# Patient Record
Sex: FEMALE | Race: WHITE | NOT HISPANIC OR LATINO | Employment: FULL TIME | URBAN - METROPOLITAN AREA
[De-identification: names, ages, dates, MRNs, and addresses within clinical notes are randomized per-mention and may not be internally consistent; named-entity substitution may affect disease eponyms.]

---

## 2023-08-15 ENCOUNTER — HOSPITAL ENCOUNTER (OUTPATIENT)
Dept: DATA CONVERSION | Facility: HOSPITAL | Age: 28
Discharge: HOME | End: 2023-08-15

## 2023-08-15 DIAGNOSIS — Z01.419 ENCOUNTER FOR GYNECOLOGICAL EXAMINATION (GENERAL) (ROUTINE) WITHOUT ABNORMAL FINDINGS: ICD-10-CM

## 2023-09-06 PROBLEM — F41.9 ANXIETY: Status: ACTIVE | Noted: 2023-09-06

## 2023-09-06 RX ORDER — FLUOXETINE HYDROCHLORIDE 40 MG/1
CAPSULE ORAL
COMMUNITY
Start: 2013-03-06 | End: 2024-01-09 | Stop reason: DRUGHIGH

## 2024-01-09 ENCOUNTER — OFFICE VISIT (OUTPATIENT)
Dept: PRIMARY CARE | Facility: CLINIC | Age: 29
End: 2024-01-09
Payer: COMMERCIAL

## 2024-01-09 VITALS
TEMPERATURE: 98.1 F | DIASTOLIC BLOOD PRESSURE: 74 MMHG | HEART RATE: 79 BPM | BODY MASS INDEX: 23.69 KG/M2 | SYSTOLIC BLOOD PRESSURE: 110 MMHG | WEIGHT: 138 LBS | OXYGEN SATURATION: 97 %

## 2024-01-09 DIAGNOSIS — R73.01 ELEVATED FASTING GLUCOSE: ICD-10-CM

## 2024-01-09 DIAGNOSIS — Z11.59 NEED FOR HEPATITIS C SCREENING TEST: ICD-10-CM

## 2024-01-09 DIAGNOSIS — R53.83 FATIGUE, UNSPECIFIED TYPE: ICD-10-CM

## 2024-01-09 DIAGNOSIS — Z01.89 ENCOUNTER FOR ROUTINE LABORATORY TESTING: ICD-10-CM

## 2024-01-09 DIAGNOSIS — Z00.00 ANNUAL PHYSICAL EXAM: Primary | ICD-10-CM

## 2024-01-09 DIAGNOSIS — F41.9 ANXIETY: ICD-10-CM

## 2024-01-09 PROCEDURE — 1036F TOBACCO NON-USER: CPT | Performed by: NURSE PRACTITIONER

## 2024-01-09 PROCEDURE — 99395 PREV VISIT EST AGE 18-39: CPT | Performed by: NURSE PRACTITIONER

## 2024-01-09 RX ORDER — NORETHINDRONE ACETATE AND ETHINYL ESTRADIOL 1MG-20(21)
1 KIT ORAL DAILY
COMMUNITY

## 2024-01-09 RX ORDER — FLUOXETINE HYDROCHLORIDE 60 MG/1
60 TABLET, FILM COATED ORAL; ORAL DAILY
Qty: 90 TABLET | Refills: 3 | Status: SHIPPED | OUTPATIENT
Start: 2024-01-09 | End: 2025-01-08

## 2024-01-09 ASSESSMENT — ENCOUNTER SYMPTOMS
COUGH: 0
POLYDIPSIA: 0
APPETITE CHANGE: 0
WOUND: 0
DIAPHORESIS: 0
BACK PAIN: 0
SHORTNESS OF BREATH: 0
NECK PAIN: 0
FATIGUE: 1
NERVOUS/ANXIOUS: 1
BLOOD IN STOOL: 0
HEADACHES: 0
DYSURIA: 0
OCCASIONAL FEELINGS OF UNSTEADINESS: 0
ABDOMINAL PAIN: 0
CHEST TIGHTNESS: 0
POLYPHAGIA: 0
VOMITING: 0
CHILLS: 0
FEVER: 0
PALPITATIONS: 0
SEIZURES: 0
SLEEP DISTURBANCE: 0
DEPRESSION: 0
SPEECH DIFFICULTY: 0
DIZZINESS: 0
LOSS OF SENSATION IN FEET: 0
DYSPHORIC MOOD: 0
FLANK PAIN: 0
HEMATURIA: 0
NAUSEA: 0

## 2024-01-09 ASSESSMENT — PATIENT HEALTH QUESTIONNAIRE - PHQ9
SUM OF ALL RESPONSES TO PHQ9 QUESTIONS 1 AND 2: 0
1. LITTLE INTEREST OR PLEASURE IN DOING THINGS: NOT AT ALL
2. FEELING DOWN, DEPRESSED OR HOPELESS: NOT AT ALL

## 2024-01-09 ASSESSMENT — PAIN SCALES - GENERAL: PAINLEVEL: 0-NO PAIN

## 2024-01-09 NOTE — PROGRESS NOTES
[facility]: [facility]  PHYSICAL EXAM      Pretty Villarreal is a 29 y.o. female that is presenting today for Annual Exam.    Assessment/Plan     Subjective   Subjective  Pretty Villarreal is a 29 y.o. female who presents for follow up of anxiety disorder and OCD. Current symptoms: none. She denies current suicidal and homicidal ideation. She complains of the following side effects from the treatment: none.     Objective  /74 (BP Location: Right arm, Patient Position: Sitting, BP Cuff Size: Adult)   Pulse 79   Temp 36.7 °C (98.1 °F) (Temporal)   Wt 62.6 kg (138 lb)   SpO2 97%   BMI 23.69 kg/m²    General:    alert and oriented, in no acute distress  Affect/Behavior:     full facial expressions, good grooming, good insight, normal perception, normal reasoning, normal speech pattern and content, and normal thought patterns     Assessment/Plan  Anxiety Disorder - unchanged, stable  Medications: Prozac.  Instructed patient to contact office or on-call physician promptly should condition worsen or any new symptoms appear and provided on-call telephone numbers. IF THE PATIENT HAS ANY SUICIDAL OR HOMICIDAL IDEATIONS, CALL THE OFFICE, DISCUSS WITH A SUPPORT MEMBER, OR GO TO THE ER IMMEDIATELY. Patient was agreeable with this plan.  Follow up: 1 year        Review of Systems   Constitutional:  Positive for fatigue. Negative for appetite change, chills, diaphoresis and fever.   HENT:  Negative for hearing loss and mouth sores.    Eyes:  Negative for visual disturbance.   Respiratory:  Negative for cough, chest tightness and shortness of breath.    Cardiovascular:  Negative for chest pain, palpitations and leg swelling.   Gastrointestinal:  Negative for abdominal pain, blood in stool, nausea and vomiting.   Endocrine: Negative for cold intolerance, heat intolerance, polydipsia, polyphagia and polyuria.   Genitourinary:  Negative for dysuria, flank pain and hematuria.   Musculoskeletal:   Negative for back pain and neck pain.   Skin:  Negative for rash and wound.   Allergic/Immunologic: Negative for environmental allergies, food allergies and immunocompromised state.   Neurological:  Negative for dizziness, seizures, syncope, speech difficulty and headaches.   Psychiatric/Behavioral:  Negative for dysphoric mood, sleep disturbance and suicidal ideas. The patient is nervous/anxious.       Objective   Vitals:    01/09/24 1536   BP: 110/74   Pulse: 79   Temp: 36.7 °C (98.1 °F)   SpO2: 97%     Body mass index is 23.69 kg/m².  Physical Exam  Vitals and nursing note reviewed.   Constitutional:       General: She is not in acute distress.     Appearance: Normal appearance. She is not ill-appearing.   HENT:      Head: Normocephalic and atraumatic.      Right Ear: Tympanic membrane, ear canal and external ear normal. There is no impacted cerumen.      Left Ear: Tympanic membrane, ear canal and external ear normal. There is no impacted cerumen.      Nose: Nose normal.      Mouth/Throat:      Mouth: Mucous membranes are moist.      Pharynx: Oropharynx is clear. No oropharyngeal exudate or posterior oropharyngeal erythema.   Eyes:      General: No scleral icterus.        Right eye: No discharge.         Left eye: No discharge.      Extraocular Movements: Extraocular movements intact.      Conjunctiva/sclera: Conjunctivae normal.      Pupils: Pupils are equal, round, and reactive to light.   Neck:      Vascular: No carotid bruit.   Cardiovascular:      Rate and Rhythm: Normal rate and regular rhythm.      Pulses: Normal pulses.      Heart sounds: Normal heart sounds. No murmur heard.     No friction rub. No gallop.   Pulmonary:      Effort: Pulmonary effort is normal. No respiratory distress.      Breath sounds: Normal breath sounds.   Abdominal:      General: Abdomen is flat. Bowel sounds are normal. There is no distension.      Palpations: Abdomen is soft.      Tenderness: There is no abdominal tenderness.       "Hernia: No hernia is present.   Musculoskeletal:         General: No swelling or tenderness. Normal range of motion.   Lymphadenopathy:      Cervical: No cervical adenopathy.   Skin:     General: Skin is warm and dry.      Coloration: Skin is not jaundiced.      Findings: No rash.   Neurological:      General: No focal deficit present.      Mental Status: She is alert and oriented to person, place, and time. Mental status is at baseline.   Psychiatric:         Mood and Affect: Mood normal.         Behavior: Behavior normal.       Diagnostic Results   Lab Results   Component Value Date    GLUCOSE 100 (H) 12/29/2022    CALCIUM 9.0 12/29/2022     12/29/2022    K 4.1 12/29/2022    CO2 26 12/29/2022     12/29/2022    BUN 12 12/29/2022    CREATININE 0.8 12/29/2022     Lab Results   Component Value Date    ALT 28 07/27/2022    AST 21 07/27/2022    ALKPHOS 40 07/27/2022    BILITOT 0.2 07/27/2022     Lab Results   Component Value Date    WBC 5.4 12/29/2022    HGB 13.1 12/29/2022    HCT 40.5 12/29/2022    MCV 90.0 12/29/2022     12/29/2022     Lab Results   Component Value Date    CHOL 172 12/29/2022    CHOL 198 12/27/2021     Lab Results   Component Value Date    HDL 50 (L) 12/29/2022    HDL 62 12/27/2021     Lab Results   Component Value Date    LDLCALC 96 12/29/2022    LDLCALC 117 12/27/2021     Lab Results   Component Value Date    TRIG 131 12/29/2022    TRIG 93 12/27/2021     No components found for: \"CHOLHDL\"  No results found for: \"HGBA1C\"  Other labs not included in the list above were reviewed either before or during this encounter.    History   History reviewed. No pertinent past medical history.  History reviewed. No pertinent surgical history.  Family History   Problem Relation Name Age of Onset    Heart failure Maternal Grandmother      Heart disease Maternal Grandmother      Diabetes Paternal Grandmother      Ovarian cancer Other Aunt         early 50s     Social History     Socioeconomic " History    Marital status: Single     Spouse name: Not on file    Number of children: Not on file    Years of education: Not on file    Highest education level: Not on file   Occupational History    Not on file   Tobacco Use    Smoking status: Never    Smokeless tobacco: Never   Substance and Sexual Activity    Alcohol use: Never    Drug use: Never    Sexual activity: Not on file   Other Topics Concern    Not on file   Social History Narrative    Not on file     Social Determinants of Health     Financial Resource Strain: Not on file   Food Insecurity: Not on file   Transportation Needs: Not on file   Physical Activity: Not on file   Stress: Not on file   Social Connections: Not on file   Intimate Partner Violence: Not on file   Housing Stability: Not on file     Allergies   Allergen Reactions    Latex Unknown    Hydrocodone-Acetaminophen Rash    Morphine Rash     Current Outpatient Medications on File Prior to Visit   Medication Sig Dispense Refill    FLUoxetine (PROzac) 40 mg capsule Take by mouth.      norethindrone-e.estradioL-iron (Junel FE 1/20) 1 mg-20 mcg (21)/75 mg (7) tablet Take 1 tablet by mouth once daily.       No current facility-administered medications on file prior to visit.     Immunization History   Administered Date(s) Administered    DTP 1994, 1994, 1994, 07/21/1995, 05/28/1999    DTaP vaccine, pediatric  (INFANRIX) 07/26/1999    DTaP, Unspecified 1994, 1994, 1994, 08/22/1995, 07/06/1999    Flu vaccine (IIV4), preservative free *Check age/dose* 10/06/2016, 10/10/2017, 10/26/2017, 10/03/2019, 11/08/2021, 11/30/2022    HPV, Quadrivalent 08/23/2007, 10/24/2007, 02/27/2008    Hepatitis A vaccine, age 19 years and greater (HAVRIX) 04/04/2015, 05/24/2017    Hepatitis A vaccine, pediatric/adolescent (HAVRIX, VAQTA) 08/07/2009, 02/16/2010, 04/14/2011    Hepatitis B vaccine, adult (RECOMBIVAX, ENGERIX) 06/06/2015, 03/27/2023    Hepatitis B vaccine,  pediatric/adolescent (RECOMBIVAX, ENGERIX) 1994, 1994, 1994, 1994, 1994, 01/08/1995, 04/15/2015    HiB PRP-OMP conjugate vaccine, pediatric (PEDVAXHIB) 1994, 1994, 1994, 04/25/1995    HiB, unspecified 1994, 1994, 1994, 06/24/1995    Influenza Whole 10/14/1996, 11/11/1996, 10/28/1997, 10/27/1998, 11/24/1999    Influenza, Recombinant, injectable, preservative free 10/01/2018    Influenza, Unspecified 11/02/2010, 10/01/2021    Influenza, injectable, MDCK, preservative free, quadrivalent 10/10/2018, 10/01/2019, 10/13/2022    Influenza, injectable, MDCK, quadrivalent 10/14/2020    Influenza, live, intranasal, quadrivalent 10/22/2009    Influenza, seasonal, injectable 11/29/2005, 10/24/2006, 10/24/2007, 10/21/2008, 10/20/2011    Influenza, seasonal, injectable, preservative free 12/20/2000, 12/07/2001, 01/23/2003, 10/04/2015, 10/30/2016    MMR vaccine, subcutaneous (MMR II) 04/25/1995, 06/24/1995, 05/28/1999, 07/06/1999, 07/26/1999    Meningococcal MCV4P 08/23/2007, 10/12/2007, 08/02/2011, 04/04/2015, 10/03/2019    Moderna COVID-19 vaccine, bivalent, blue cap/gray label *Check age/dose* 11/12/2022    Moderna SARS-CoV-2 Vaccination 10/29/2021    Novel influenza-H1N1-09, preservative-free 11/22/2009    OPV 1994, 1994, 08/22/1995, 07/26/1999    Polio, Unspecified 1994, 1994, 08/22/1995, 07/06/1999    Poliovirus vaccine, subcutaneous (IPOL) 1994, 1994, 1994, 05/28/1999    Tdap vaccine, age 7 year and older (BOOSTRIX) 08/22/2006, 09/28/2006, 08/22/2007, 08/10/2010, 01/01/2016, 07/19/2016, 12/28/2017, 10/03/2019    Typhoid, ViCPs 05/24/2017    Varicella vaccine, subcutaneous (VARIVAX) 07/21/1995, 04/14/2011     Patient's medical history was reviewed and updated either before or during this encounter.       Jasmin Carbajal, APRN-CNP

## 2024-01-15 ENCOUNTER — LAB (OUTPATIENT)
Dept: LAB | Facility: LAB | Age: 29
End: 2024-01-15
Payer: COMMERCIAL

## 2024-01-15 DIAGNOSIS — R73.01 ELEVATED FASTING GLUCOSE: ICD-10-CM

## 2024-01-15 DIAGNOSIS — Z01.89 ENCOUNTER FOR ROUTINE LABORATORY TESTING: ICD-10-CM

## 2024-01-15 DIAGNOSIS — R53.83 FATIGUE, UNSPECIFIED TYPE: ICD-10-CM

## 2024-01-15 DIAGNOSIS — Z11.59 NEED FOR HEPATITIS C SCREENING TEST: ICD-10-CM

## 2024-01-15 LAB
ANION GAP SERPL CALC-SCNC: 9 MMOL/L
BASOPHILS # BLD AUTO: 0.08 X10*3/UL (ref 0–0.1)
BASOPHILS NFR BLD AUTO: 1.1 %
BUN SERPL-MCNC: 10 MG/DL (ref 8–25)
CALCIUM SERPL-MCNC: 9.2 MG/DL (ref 8.5–10.4)
CHLORIDE SERPL-SCNC: 101 MMOL/L (ref 97–107)
CO2 SERPL-SCNC: 26 MMOL/L (ref 24–31)
CREAT SERPL-MCNC: 0.8 MG/DL (ref 0.4–1.6)
EGFRCR SERPLBLD CKD-EPI 2021: >90 ML/MIN/1.73M*2
EOSINOPHIL # BLD AUTO: 0.23 X10*3/UL (ref 0–0.7)
EOSINOPHIL NFR BLD AUTO: 3.2 %
ERYTHROCYTE [DISTWIDTH] IN BLOOD BY AUTOMATED COUNT: 12.9 % (ref 11.5–14.5)
GLUCOSE SERPL-MCNC: 84 MG/DL (ref 65–99)
HCT VFR BLD AUTO: 40.7 % (ref 36–46)
HCV AB SER QL: NONREACTIVE
HGB BLD-MCNC: 12.9 G/DL (ref 12–16)
IMM GRANULOCYTES # BLD AUTO: 0.03 X10*3/UL (ref 0–0.7)
IMM GRANULOCYTES NFR BLD AUTO: 0.4 % (ref 0–0.9)
LYMPHOCYTES # BLD AUTO: 1.82 X10*3/UL (ref 1.2–4.8)
LYMPHOCYTES NFR BLD AUTO: 25.7 %
MCH RBC QN AUTO: 28.7 PG (ref 26–34)
MCHC RBC AUTO-ENTMCNC: 31.7 G/DL (ref 32–36)
MCV RBC AUTO: 90 FL (ref 80–100)
MONOCYTES # BLD AUTO: 0.38 X10*3/UL (ref 0.1–1)
MONOCYTES NFR BLD AUTO: 5.4 %
NEUTROPHILS # BLD AUTO: 4.55 X10*3/UL (ref 1.2–7.7)
NEUTROPHILS NFR BLD AUTO: 64.2 %
NRBC BLD-RTO: 0 /100 WBCS (ref 0–0)
PLATELET # BLD AUTO: 382 X10*3/UL (ref 150–450)
POTASSIUM SERPL-SCNC: 4.3 MMOL/L (ref 3.4–5.1)
RBC # BLD AUTO: 4.5 X10*6/UL (ref 4–5.2)
SODIUM SERPL-SCNC: 136 MMOL/L (ref 133–145)
WBC # BLD AUTO: 7.1 X10*3/UL (ref 4.4–11.3)

## 2024-01-15 PROCEDURE — 85025 COMPLETE CBC W/AUTO DIFF WBC: CPT

## 2024-01-15 PROCEDURE — 36415 COLL VENOUS BLD VENIPUNCTURE: CPT

## 2024-01-15 PROCEDURE — 86803 HEPATITIS C AB TEST: CPT

## 2024-01-15 PROCEDURE — 80048 BASIC METABOLIC PNL TOTAL CA: CPT

## 2024-01-19 DIAGNOSIS — F41.9 ANXIETY: ICD-10-CM

## 2024-01-22 RX ORDER — FLUOXETINE HYDROCHLORIDE 60 MG/1
60 TABLET, FILM COATED ORAL; ORAL DAILY
Qty: 30 TABLET | Refills: 11 | OUTPATIENT
Start: 2024-01-22 | End: 2024-04-21

## 2024-07-29 ENCOUNTER — OFFICE VISIT (OUTPATIENT)
Dept: PRIMARY CARE | Facility: CLINIC | Age: 29
End: 2024-07-29
Payer: COMMERCIAL

## 2024-07-29 VITALS
WEIGHT: 126 LBS | TEMPERATURE: 98.1 F | RESPIRATION RATE: 18 BRPM | BODY MASS INDEX: 21.63 KG/M2 | HEART RATE: 102 BPM | OXYGEN SATURATION: 98 % | SYSTOLIC BLOOD PRESSURE: 120 MMHG | DIASTOLIC BLOOD PRESSURE: 84 MMHG

## 2024-07-29 DIAGNOSIS — F41.9 ANXIETY: Primary | ICD-10-CM

## 2024-07-29 DIAGNOSIS — K30 INDIGESTION: ICD-10-CM

## 2024-07-29 PROCEDURE — 99213 OFFICE O/P EST LOW 20 MIN: CPT | Performed by: FAMILY MEDICINE

## 2024-07-29 PROCEDURE — 1036F TOBACCO NON-USER: CPT | Performed by: FAMILY MEDICINE

## 2024-07-29 RX ORDER — BUSPIRONE HYDROCHLORIDE 5 MG/1
5 TABLET ORAL 2 TIMES DAILY PRN
Qty: 60 TABLET | Refills: 0 | Status: SHIPPED | OUTPATIENT
Start: 2024-07-29 | End: 2024-08-28

## 2024-07-29 ASSESSMENT — COLUMBIA-SUICIDE SEVERITY RATING SCALE - C-SSRS
1. IN THE PAST MONTH, HAVE YOU WISHED YOU WERE DEAD OR WISHED YOU COULD GO TO SLEEP AND NOT WAKE UP?: NO
2. HAVE YOU ACTUALLY HAD ANY THOUGHTS OF KILLING YOURSELF?: NO
6. HAVE YOU EVER DONE ANYTHING, STARTED TO DO ANYTHING, OR PREPARED TO DO ANYTHING TO END YOUR LIFE?: NO

## 2024-07-29 ASSESSMENT — ENCOUNTER SYMPTOMS
VOMITING: 0
CONSTIPATION: 0
BELCHING: 0
ABDOMINAL PAIN: 1
HEMATOCHEZIA: 0
HEADACHES: 0
MYALGIAS: 0
FEVER: 0
DYSURIA: 0
HEMATURIA: 0
DIARRHEA: 0
FREQUENCY: 0
FLATUS: 0
NAUSEA: 1
ANOREXIA: 0

## 2024-07-29 ASSESSMENT — PAIN SCALES - GENERAL: PAINLEVEL: 7

## 2024-07-29 ASSESSMENT — PATIENT HEALTH QUESTIONNAIRE - PHQ9
SUM OF ALL RESPONSES TO PHQ9 QUESTIONS 1 AND 2: 0
2. FEELING DOWN, DEPRESSED OR HOPELESS: NOT AT ALL
1. LITTLE INTEREST OR PLEASURE IN DOING THINGS: NOT AT ALL

## 2024-07-29 NOTE — PATIENT INSTRUCTIONS
-will start buspar prn for anxiety  -reviewed home remedies to decrease stress and anxiety  -follow up with PCP for anxiety  -BRAT diet  -OTC pepto  -OTC prilosec if no improvement with pepto

## 2024-07-29 NOTE — PROGRESS NOTES
"Subjective   Patient ID: Pretty Villarreal is a 29 y.o. female who presents for Abdominal Pain (Pt got a puppy last week and has issues with life changes.  Says her stomach is \"in knots\". Soft stools but going regularly. ).    Pt presents with c/o upset stomach. Hx OCD, anxiety, depression. Struggles with change. Recently got a puppy. Taking fluoxetine as ordered. Increased stress. Feels like her stomach is in \"knots\". This often happens when her anxiety worsens. S/s started 1-2 weeks ago. Denies abd pain, cramping. BM once a day. Baseline is BM Q3 days. Stool is soft, brown. Decreased appetite. No change in diet. C/o nausea but no vomiting. Denies bloating, increased gas, change in periods. Due for period to start in 2 days. No personal or family hx of GI disease. Denies SI. States pt feels safe at home. Anticipating being home alone with dog tomorrow is making her very anxious. Looking into options for dog training. Requesting an as needed medication for anxiety.       Abdominal Pain  The pain is located in the epigastric region. Associated symptoms include nausea. Pertinent negatives include no anorexia, belching, constipation, diarrhea, dysuria, fever, flatus, frequency, headaches, hematochezia, hematuria, melena, myalgias or vomiting. She has tried nothing for the symptoms.        Review of Systems   Constitutional:  Negative for fever.   Gastrointestinal:  Positive for abdominal pain and nausea. Negative for anorexia, constipation, diarrhea, flatus, hematochezia, melena and vomiting.   Genitourinary:  Negative for dysuria, frequency and hematuria.   Musculoskeletal:  Negative for myalgias.   Neurological:  Negative for headaches.       Objective   /84 (BP Location: Left arm, Patient Position: Sitting)   Pulse 102   Temp 36.7 °C (98.1 °F)   Resp 18   Wt 57.2 kg (126 lb)   LMP 07/03/2024 (Exact Date)   SpO2 98%   BMI 21.63 kg/m²     Physical Exam    Assessment/Plan   Diagnoses and all orders for this " visit:  Anxiety  -     busPIRone (Buspar) 5 mg tablet; Take 1 tablet (5 mg) by mouth 2 times a day as needed (for anxiety).  Indigestion    -will start buspar prn for anxiety  -reviewed home remedies to decrease stress and anxiety  -follow up with PCP for anxiety  -BRAT diet  -OTC pepto  -OTC prilosec if no improvement with pepto

## 2024-08-16 PROCEDURE — 87624 HPV HI-RISK TYP POOLED RSLT: CPT

## 2024-08-16 PROCEDURE — 88175 CYTOPATH C/V AUTO FLUID REDO: CPT

## 2024-08-20 ENCOUNTER — LAB REQUISITION (OUTPATIENT)
Dept: LAB | Facility: HOSPITAL | Age: 29
End: 2024-08-20
Payer: COMMERCIAL

## 2024-08-20 DIAGNOSIS — Z12.4 ENCOUNTER FOR SCREENING FOR MALIGNANT NEOPLASM OF CERVIX: ICD-10-CM

## 2024-08-20 DIAGNOSIS — Z11.51 ENCOUNTER FOR SCREENING FOR HUMAN PAPILLOMAVIRUS (HPV): ICD-10-CM

## 2024-08-20 DIAGNOSIS — Z01.419 ENCOUNTER FOR GYNECOLOGICAL EXAMINATION (GENERAL) (ROUTINE) WITHOUT ABNORMAL FINDINGS: ICD-10-CM

## 2024-12-13 ENCOUNTER — LAB REQUISITION (OUTPATIENT)
Dept: LAB | Facility: HOSPITAL | Age: 29
End: 2024-12-13
Payer: COMMERCIAL

## 2024-12-13 DIAGNOSIS — N76.2 ACUTE VULVITIS: ICD-10-CM

## 2024-12-13 DIAGNOSIS — N90.89 OTHER SPECIFIED NONINFLAMMATORY DISORDERS OF VULVA AND PERINEUM: ICD-10-CM

## 2024-12-13 PROCEDURE — 87205 SMEAR GRAM STAIN: CPT

## 2024-12-14 LAB
CLUE CELLS VAG LPF-#/AREA: NORMAL /[LPF]
NUGENT SCORE: 1
YEAST VAG WET PREP-#/AREA: NORMAL

## 2024-12-30 ENCOUNTER — OFFICE VISIT (OUTPATIENT)
Dept: PRIMARY CARE | Facility: CLINIC | Age: 29
End: 2024-12-30
Payer: COMMERCIAL

## 2024-12-30 VITALS
SYSTOLIC BLOOD PRESSURE: 100 MMHG | WEIGHT: 136 LBS | HEIGHT: 66 IN | TEMPERATURE: 97.8 F | HEART RATE: 72 BPM | BODY MASS INDEX: 21.86 KG/M2 | DIASTOLIC BLOOD PRESSURE: 72 MMHG | OXYGEN SATURATION: 98 %

## 2024-12-30 DIAGNOSIS — Z00.00 ANNUAL PHYSICAL EXAM: Primary | ICD-10-CM

## 2024-12-30 DIAGNOSIS — R53.83 FATIGUE, UNSPECIFIED TYPE: ICD-10-CM

## 2024-12-30 DIAGNOSIS — F41.9 ANXIETY: ICD-10-CM

## 2024-12-30 PROCEDURE — 99395 PREV VISIT EST AGE 18-39: CPT | Performed by: NURSE PRACTITIONER

## 2024-12-30 PROCEDURE — 3008F BODY MASS INDEX DOCD: CPT | Performed by: NURSE PRACTITIONER

## 2024-12-30 PROCEDURE — 1036F TOBACCO NON-USER: CPT | Performed by: NURSE PRACTITIONER

## 2024-12-30 RX ORDER — FLUOXETINE HYDROCHLORIDE 60 MG/1
60 TABLET, FILM COATED ORAL; ORAL DAILY
Qty: 90 TABLET | Refills: 3 | Status: SHIPPED | OUTPATIENT
Start: 2024-12-30 | End: 2025-12-30

## 2024-12-30 ASSESSMENT — LIFESTYLE VARIABLES
HAS A RELATIVE, FRIEND, DOCTOR, OR ANOTHER HEALTH PROFESSIONAL EXPRESSED CONCERN ABOUT YOUR DRINKING OR SUGGESTED YOU CUT DOWN: NO
HOW OFTEN DO YOU HAVE A DRINK CONTAINING ALCOHOL: 2-4 TIMES A MONTH
HOW OFTEN DURING THE LAST YEAR HAVE YOU FAILED TO DO WHAT WAS NORMALLY EXPECTED FROM YOU BECAUSE OF DRINKING: NEVER
HOW OFTEN DO YOU HAVE A DRINK CONTAINING ALCOHOL: NEVER
SKIP TO QUESTIONS 9-10: 1
HOW OFTEN DURING THE LAST YEAR HAVE YOU FAILED TO DO WHAT WAS NORMALLY EXPECTED FROM YOU BECAUSE OF DRINKING: NEVER
HOW OFTEN DURING THE LAST YEAR HAVE YOU NEEDED AN ALCOHOLIC DRINK FIRST THING IN THE MORNING TO GET YOURSELF GOING AFTER A NIGHT OF HEAVY DRINKING: NEVER
AUDIT TOTAL SCORE: 2
AUDIT-C TOTAL SCORE: 2
SKIP TO QUESTIONS 9-10: 1
HOW OFTEN DURING THE LAST YEAR HAVE YOU BEEN UNABLE TO REMEMBER WHAT HAPPENED THE NIGHT BEFORE BECAUSE YOU HAD BEEN DRINKING: NEVER
HOW OFTEN DURING THE LAST YEAR HAVE YOU FOUND THAT YOU WERE NOT ABLE TO STOP DRINKING ONCE YOU HAD STARTED: NEVER
HOW MANY STANDARD DRINKS CONTAINING ALCOHOL DO YOU HAVE ON A TYPICAL DAY: 1 OR 2
AUDIT TOTAL SCORE: 0
HOW MANY STANDARD DRINKS CONTAINING ALCOHOL DO YOU HAVE ON A TYPICAL DAY: PATIENT DOES NOT DRINK
HOW OFTEN DO YOU HAVE SIX OR MORE DRINKS ON ONE OCCASION: NEVER
HOW OFTEN DURING THE LAST YEAR HAVE YOU BEEN UNABLE TO REMEMBER WHAT HAPPENED THE NIGHT BEFORE BECAUSE YOU HAD BEEN DRINKING: NEVER
HOW OFTEN DO YOU HAVE SIX OR MORE DRINKS ON ONE OCCASION: NEVER
HOW OFTEN DURING THE LAST YEAR HAVE YOU HAD A FEELING OF GUILT OR REMORSE AFTER DRINKING: NEVER
HAS A RELATIVE, FRIEND, DOCTOR, OR ANOTHER HEALTH PROFESSIONAL EXPRESSED CONCERN ABOUT YOUR DRINKING OR SUGGESTED YOU CUT DOWN: NO
AUDIT-C TOTAL SCORE: 0
HOW OFTEN DURING THE LAST YEAR HAVE YOU HAD A FEELING OF GUILT OR REMORSE AFTER DRINKING: NEVER
HOW OFTEN DURING THE LAST YEAR HAVE YOU FOUND THAT YOU WERE NOT ABLE TO STOP DRINKING ONCE YOU HAD STARTED: NEVER
HAVE YOU OR SOMEONE ELSE BEEN INJURED AS A RESULT OF YOUR DRINKING: NO
HOW OFTEN DURING THE LAST YEAR HAVE YOU NEEDED AN ALCOHOLIC DRINK FIRST THING IN THE MORNING TO GET YOURSELF GOING AFTER A NIGHT OF HEAVY DRINKING: NEVER
HAVE YOU OR SOMEONE ELSE BEEN INJURED AS A RESULT OF YOUR DRINKING: NO

## 2024-12-30 ASSESSMENT — PAIN SCALES - GENERAL: PAINLEVEL_OUTOF10: 0-NO PAIN

## 2024-12-30 ASSESSMENT — PATIENT HEALTH QUESTIONNAIRE - PHQ9
2. FEELING DOWN, DEPRESSED OR HOPELESS: NOT AT ALL
1. LITTLE INTEREST OR PLEASURE IN DOING THINGS: NOT AT ALL
SUM OF ALL RESPONSES TO PHQ9 QUESTIONS 1 AND 2: 0

## 2024-12-30 ASSESSMENT — ENCOUNTER SYMPTOMS
FATIGUE: 0
DIZZINESS: 0
ADENOPATHY: 0
OCCASIONAL FEELINGS OF UNSTEADINESS: 0
NECK PAIN: 0
HEADACHES: 0
HEMATURIA: 0
SPEECH DIFFICULTY: 0
CONFUSION: 0
DEPRESSION: 0
FACIAL ASYMMETRY: 0
CHEST TIGHTNESS: 0
FLANK PAIN: 0
DIAPHORESIS: 0
BRUISES/BLEEDS EASILY: 0
FEVER: 0
ABDOMINAL PAIN: 0
POLYDIPSIA: 0
BACK PAIN: 0
COUGH: 0
CHILLS: 0
SEIZURES: 0
DYSURIA: 0
POLYPHAGIA: 0
WOUND: 0
LOSS OF SENSATION IN FEET: 0
SHORTNESS OF BREATH: 0
PALPITATIONS: 0
AGITATION: 0
BLOOD IN STOOL: 0
NAUSEA: 0
VOMITING: 0

## 2024-12-30 NOTE — PROGRESS NOTES
[facility]: [facility]  PHYSICAL EXAM      Pretty Villarreal is a 29 y.o. female that is presenting today for Annual Physical Exam.    Advised Covid 19 immunization from local pharmacy.    She was seen by her OB/GYN, Dr. Miramontes, for annual care on 12/13/24.    Ms. Villarreal reports her anxiety and OCD is managed effectively with SSRI. She is not currently involved in either individual or group therapy. Reports her symptoms are stable.    Assessment/Plan     Diagnoses and all orders for this visit:    Annual physical exam    Anxiety  -     Stable on SSRI  -     FLUoxetine (PROzac) 60 mg tablet; Take 1 tablet (60 mg) by mouth once daily.    Fatigue, unspecified type  -     CBC and Auto Differential; Future  -     Basic Metabolic Panel; Future    Other orders  -     Follow Up In Primary Care - Health Maintenance  -     Follow Up In Primary Care - Health Maintenance; Future    Subjective   HPI  Review of Systems   Constitutional:  Negative for chills, diaphoresis, fatigue and fever.   HENT:  Negative for hearing loss and mouth sores.    Eyes:  Negative for visual disturbance.   Respiratory:  Negative for cough, chest tightness and shortness of breath.    Cardiovascular:  Negative for chest pain, palpitations and leg swelling.   Gastrointestinal:  Negative for abdominal pain, blood in stool, nausea and vomiting.   Endocrine: Negative for cold intolerance, heat intolerance, polydipsia, polyphagia and polyuria.   Genitourinary:  Negative for dysuria, flank pain and hematuria.   Musculoskeletal:  Negative for back pain and neck pain.   Skin:  Negative for rash and wound.   Allergic/Immunologic: Positive for environmental allergies (latex). Negative for food allergies and immunocompromised state.   Neurological:  Negative for dizziness, seizures, syncope, facial asymmetry, speech difficulty and headaches.   Hematological:  Negative for adenopathy. Does not bruise/bleed easily.   Psychiatric/Behavioral:   Negative for agitation and confusion.       Objective   Vitals:    12/30/24 1524   BP: 100/72   Pulse: 72   Temp: 36.6 °C (97.8 °F)   SpO2: 98%     Body mass index is 21.95 kg/m².  Physical Exam  Vitals and nursing note reviewed.   Constitutional:       General: She is not in acute distress.     Appearance: Normal appearance. She is not ill-appearing.   HENT:      Head: Normocephalic and atraumatic.      Right Ear: Tympanic membrane, ear canal and external ear normal. There is no impacted cerumen.      Left Ear: Tympanic membrane, ear canal and external ear normal. There is no impacted cerumen.      Nose: Nose normal.      Mouth/Throat:      Mouth: Mucous membranes are moist.      Pharynx: Oropharynx is clear. No oropharyngeal exudate or posterior oropharyngeal erythema.   Eyes:      General: No scleral icterus.        Right eye: No discharge.         Left eye: No discharge.      Extraocular Movements: Extraocular movements intact.      Conjunctiva/sclera: Conjunctivae normal.      Pupils: Pupils are equal, round, and reactive to light.   Cardiovascular:      Rate and Rhythm: Normal rate and regular rhythm.      Pulses: Normal pulses.      Heart sounds: Normal heart sounds. No murmur heard.  Pulmonary:      Effort: Pulmonary effort is normal. No respiratory distress.      Breath sounds: Normal breath sounds.   Abdominal:      General: Abdomen is flat. Bowel sounds are normal. There is no distension.      Palpations: Abdomen is soft. There is no mass.      Tenderness: There is no abdominal tenderness. There is no right CVA tenderness or left CVA tenderness.      Hernia: No hernia is present.   Musculoskeletal:         General: No tenderness. Normal range of motion.      Cervical back: No tenderness.      Right lower leg: No edema.      Left lower leg: No edema.   Lymphadenopathy:      Cervical: No cervical adenopathy.   Skin:     General: Skin is warm and dry.      Coloration: Skin is not jaundiced.      Findings: No  "rash.   Neurological:      General: No focal deficit present.      Mental Status: She is alert and oriented to person, place, and time. Mental status is at baseline.   Psychiatric:         Mood and Affect: Mood normal.         Behavior: Behavior normal.       Diagnostic Results   Lab Results   Component Value Date    GLUCOSE 84 01/15/2024    CALCIUM 9.2 01/15/2024     01/15/2024    K 4.3 01/15/2024    CO2 26 01/15/2024     01/15/2024    BUN 10 01/15/2024    CREATININE 0.80 01/15/2024     Lab Results   Component Value Date    ALT 28 07/27/2022    AST 21 07/27/2022    ALKPHOS 40 07/27/2022    BILITOT 0.2 07/27/2022     Lab Results   Component Value Date    WBC 7.1 01/15/2024    HGB 12.9 01/15/2024    HCT 40.7 01/15/2024    MCV 90 01/15/2024     01/15/2024     Lab Results   Component Value Date    CHOL 172 12/29/2022    CHOL 198 12/27/2021     Lab Results   Component Value Date    HDL 50 (L) 12/29/2022    HDL 62 12/27/2021     Lab Results   Component Value Date    LDLCALC 96 12/29/2022    LDLCALC 117 12/27/2021     Lab Results   Component Value Date    TRIG 131 12/29/2022    TRIG 93 12/27/2021     No components found for: \"CHOLHDL\"  No results found for: \"HGBA1C\"  Other labs not included in the list above were reviewed either before or during this encounter.    History   History reviewed. No pertinent past medical history.  History reviewed. No pertinent surgical history.  Family History   Problem Relation Name Age of Onset    Heart failure Maternal Grandmother      Heart disease Maternal Grandmother      Diabetes Paternal Grandmother      Ovarian cancer Other Aunt         early 50s     Social History     Socioeconomic History    Marital status:      Spouse name: Not on file    Number of children: Not on file    Years of education: Not on file    Highest education level: Not on file   Occupational History    Not on file   Tobacco Use    Smoking status: Never    Smokeless tobacco: Never "   Substance and Sexual Activity    Alcohol use: Yes    Drug use: Never    Sexual activity: Not on file   Other Topics Concern    Not on file   Social History Narrative    Not on file     Social Drivers of Health     Financial Resource Strain: Not on file   Food Insecurity: Not on file   Transportation Needs: Not on file   Physical Activity: Not on file   Stress: Not on file   Social Connections: Not on file   Intimate Partner Violence: Not on file   Housing Stability: Not on file     Allergies   Allergen Reactions    Latex Unknown    Hydrocodone-Acetaminophen Rash    Morphine Rash     Current Outpatient Medications on File Prior to Visit   Medication Sig Dispense Refill    FLUoxetine (PROzac) 60 mg tablet Take 1 tablet (60 mg) by mouth once daily. 90 tablet 3    norethindrone-e.estradioL-iron (Junel FE 1/20) 1 mg-20 mcg (21)/75 mg (7) tablet Take 1 tablet by mouth once daily.      busPIRone (Buspar) 5 mg tablet Take 1 tablet (5 mg) by mouth 2 times a day as needed (for anxiety). 60 tablet 0     No current facility-administered medications on file prior to visit.     Immunization History   Administered Date(s) Administered    DTP 1994, 1994, 1994, 07/21/1995, 05/28/1999    DTaP vaccine, pediatric  (INFANRIX) 07/26/1999    DTaP, Unspecified 1994, 1994, 1994, 08/22/1995, 07/06/1999    Flu vaccine (IIV4), preservative free *Check age/dose* 10/06/2016, 10/10/2017, 10/26/2017, 10/03/2019, 11/08/2021, 11/30/2022, 10/14/2023, 11/07/2023    Flu vaccine, quadrivalent, no egg protein, age 6 month or greater (FLUCELVAX) 10/10/2018, 10/01/2019, 10/13/2022    Flu vaccine, trivalent, preservative free, HIGH-DOSE, age 65y+ (Fluzone) 10/03/2016    Flu vaccine, trivalent, preservative free, age 6 months and greater (Fluarix/Fluzone/Flulaval) 12/20/2000, 12/07/2001, 01/23/2003, 10/04/2015, 10/30/2016    Flu vaccine, trivalent, preservative free, no egg protein, age 18y+ (Flublok) 10/26/2017,  10/01/2018    HPV, Quadrivalent 08/23/2007, 10/24/2007, 02/27/2008    Hepatitis A vaccine, age 19 years and greater (HAVRIX) 04/04/2015, 05/24/2017    Hepatitis A vaccine, pediatric/adolescent (HAVRIX, VAQTA) 08/07/2009, 02/16/2010, 04/14/2011    Hepatitis B vaccine, 19 yrs and under (RECOMBIVAX, ENGERIX) 1994, 1994, 1994, 1994, 1994, 01/08/1995, 04/15/2015    Hepatitis B vaccine, adult *Check Product/Dose* 06/06/2015, 03/27/2023    HiB PRP-OMP conjugate vaccine, pediatric (PEDVAXHIB) 1994, 1994, 1994, 04/25/1995    HiB, unspecified 1994, 1994, 1994, 06/24/1995    Influenza Whole 10/14/1996, 11/11/1996, 10/28/1997, 10/27/1998, 11/24/1999    Influenza, Unspecified 11/02/2010, 10/01/2021, 11/30/2022    Influenza, injectable, MDCK, quadrivalent 10/14/2020    Influenza, injectable, quadrivalent 10/01/2019, 11/08/2021    Influenza, live, intranasal, quadrivalent 10/22/2009    Influenza, seasonal, injectable 11/29/2005, 10/24/2006, 10/24/2007, 10/21/2008, 10/20/2011    MMR vaccine, subcutaneous (MMR II) 04/25/1995, 06/24/1995, 05/28/1999, 07/06/1999, 07/26/1999    Meningococcal ACWY-D (Menactra) 4-valent conjugate vaccine 08/23/2007, 10/12/2007, 08/02/2011, 04/04/2015, 10/03/2019    Moderna COVID-19 vaccine, bivalent, blue cap/gray label *Check age/dose* 11/12/2022    Moderna SARS-CoV-2 Vaccination 01/11/2021, 02/08/2021, 10/25/2021, 10/29/2021    Novel influenza-H1N1-09, preservative-free 11/22/2009    OPV 1994, 1994, 08/22/1995, 07/26/1999    Polio, Unspecified 1994, 1994, 08/22/1995, 07/06/1999    Poliovirus vaccine, subcutaneous (IPOL) 1994, 1994, 1994, 05/28/1999    Tdap vaccine, age 7 year and older (BOOSTRIX, ADACEL) 08/22/2006, 09/28/2006, 08/22/2007, 08/10/2010, 01/01/2016, 07/19/2016, 12/28/2017, 10/03/2019    Typhoid, ViCPs 05/24/2017    Varicella vaccine, subcutaneous (VARIVAX) 07/21/1995, 04/14/2011      Patient's medical history was reviewed and updated either before or during this encounter.       CHELO Alvarado-CNP

## 2025-01-14 ENCOUNTER — APPOINTMENT (OUTPATIENT)
Dept: PRIMARY CARE | Facility: CLINIC | Age: 30
End: 2025-01-14
Payer: COMMERCIAL

## 2025-08-13 ENCOUNTER — APPOINTMENT (OUTPATIENT)
Dept: RADIOLOGY | Facility: HOSPITAL | Age: 30
End: 2025-08-13
Payer: COMMERCIAL

## 2025-08-13 ENCOUNTER — HOSPITAL ENCOUNTER (EMERGENCY)
Facility: HOSPITAL | Age: 30
Discharge: HOME | End: 2025-08-13
Payer: COMMERCIAL

## 2025-08-13 VITALS
HEIGHT: 64 IN | SYSTOLIC BLOOD PRESSURE: 130 MMHG | HEART RATE: 77 BPM | WEIGHT: 131.61 LBS | DIASTOLIC BLOOD PRESSURE: 60 MMHG | BODY MASS INDEX: 22.47 KG/M2 | RESPIRATION RATE: 18 BRPM | OXYGEN SATURATION: 99 % | TEMPERATURE: 98.1 F

## 2025-08-13 DIAGNOSIS — O20.0 THREATENED ABORTION (HHS-HCC): Primary | ICD-10-CM

## 2025-08-13 DIAGNOSIS — O20.9 VAGINAL BLEEDING AFFECTING EARLY PREGNANCY (HHS-HCC): ICD-10-CM

## 2025-08-13 LAB
ABO GROUP (TYPE) IN BLOOD: NORMAL
ABO GROUP (TYPE) IN BLOOD: NORMAL
ALBUMIN SERPL BCP-MCNC: 4.7 G/DL (ref 3.4–5)
ALP SERPL-CCNC: 37 U/L (ref 33–110)
ALT SERPL W P-5'-P-CCNC: 12 U/L (ref 7–45)
ANION GAP SERPL CALCULATED.3IONS-SCNC: 11 MMOL/L (ref 10–20)
ANTIBODY SCREEN: NORMAL
APPEARANCE UR: CLEAR
AST SERPL W P-5'-P-CCNC: 12 U/L (ref 9–39)
B-HCG SERPL-ACNC: 35 MIU/ML
BACTERIA #/AREA URNS AUTO: ABNORMAL /HPF
BASOPHILS # BLD AUTO: 0.05 X10*3/UL (ref 0–0.1)
BASOPHILS NFR BLD AUTO: 1 %
BILIRUB SERPL-MCNC: 0.5 MG/DL (ref 0–1.2)
BILIRUB UR STRIP.AUTO-MCNC: NEGATIVE MG/DL
BUN SERPL-MCNC: 8 MG/DL (ref 6–23)
CALCIUM SERPL-MCNC: 9.3 MG/DL (ref 8.6–10.3)
CHLORIDE SERPL-SCNC: 105 MMOL/L (ref 98–107)
CO2 SERPL-SCNC: 27 MMOL/L (ref 21–32)
COLOR UR: COLORLESS
CREAT SERPL-MCNC: 0.73 MG/DL (ref 0.5–1.05)
EGFRCR SERPLBLD CKD-EPI 2021: >90 ML/MIN/1.73M*2
EOSINOPHIL # BLD AUTO: 0.08 X10*3/UL (ref 0–0.7)
EOSINOPHIL NFR BLD AUTO: 1.6 %
ERYTHROCYTE [DISTWIDTH] IN BLOOD BY AUTOMATED COUNT: 12.6 % (ref 11.5–14.5)
GLUCOSE SERPL-MCNC: 108 MG/DL (ref 74–99)
GLUCOSE UR STRIP.AUTO-MCNC: NORMAL MG/DL
HCT VFR BLD AUTO: 41.5 % (ref 36–46)
HGB BLD-MCNC: 12.9 G/DL (ref 12–16)
IMM GRANULOCYTES # BLD AUTO: 0.02 X10*3/UL (ref 0–0.7)
IMM GRANULOCYTES NFR BLD AUTO: 0.4 % (ref 0–0.9)
KETONES UR STRIP.AUTO-MCNC: NEGATIVE MG/DL
LEUKOCYTE ESTERASE UR QL STRIP.AUTO: NEGATIVE
LIPASE SERPL-CCNC: 40 U/L (ref 9–82)
LYMPHOCYTES # BLD AUTO: 1.27 X10*3/UL (ref 1.2–4.8)
LYMPHOCYTES NFR BLD AUTO: 25.7 %
MAGNESIUM SERPL-MCNC: 1.97 MG/DL (ref 1.6–2.4)
MCH RBC QN AUTO: 28.2 PG (ref 26–34)
MCHC RBC AUTO-ENTMCNC: 31.1 G/DL (ref 32–36)
MCV RBC AUTO: 91 FL (ref 80–100)
MONOCYTES # BLD AUTO: 0.37 X10*3/UL (ref 0.1–1)
MONOCYTES NFR BLD AUTO: 7.5 %
NEUTROPHILS # BLD AUTO: 3.16 X10*3/UL (ref 1.2–7.7)
NEUTROPHILS NFR BLD AUTO: 63.8 %
NITRITE UR QL STRIP.AUTO: NEGATIVE
NRBC BLD-RTO: 0 /100 WBCS (ref 0–0)
PH UR STRIP.AUTO: 5.5 [PH]
PLATELET # BLD AUTO: 300 X10*3/UL (ref 150–450)
POTASSIUM SERPL-SCNC: 3.8 MMOL/L (ref 3.5–5.3)
PROT SERPL-MCNC: 7.5 G/DL (ref 6.4–8.2)
PROT UR STRIP.AUTO-MCNC: NEGATIVE MG/DL
RBC # BLD AUTO: 4.57 X10*6/UL (ref 4–5.2)
RBC # UR STRIP.AUTO: ABNORMAL MG/DL
RBC #/AREA URNS AUTO: ABNORMAL /HPF
RH FACTOR (ANTIGEN D): NORMAL
RH FACTOR (ANTIGEN D): NORMAL
SODIUM SERPL-SCNC: 139 MMOL/L (ref 136–145)
SP GR UR STRIP.AUTO: 1
SQUAMOUS #/AREA URNS AUTO: ABNORMAL /HPF
UROBILINOGEN UR STRIP.AUTO-MCNC: NORMAL MG/DL
WBC # BLD AUTO: 5 X10*3/UL (ref 4.4–11.3)
WBC #/AREA URNS AUTO: ABNORMAL /HPF

## 2025-08-13 PROCEDURE — 76801 OB US < 14 WKS SINGLE FETUS: CPT | Performed by: RADIOLOGY

## 2025-08-13 PROCEDURE — 85025 COMPLETE CBC W/AUTO DIFF WBC: CPT | Performed by: PHYSICIAN ASSISTANT

## 2025-08-13 PROCEDURE — 36415 COLL VENOUS BLD VENIPUNCTURE: CPT | Performed by: PHYSICIAN ASSISTANT

## 2025-08-13 PROCEDURE — 83735 ASSAY OF MAGNESIUM: CPT | Performed by: PHYSICIAN ASSISTANT

## 2025-08-13 PROCEDURE — 83690 ASSAY OF LIPASE: CPT | Performed by: PHYSICIAN ASSISTANT

## 2025-08-13 PROCEDURE — 2500000004 HC RX 250 GENERAL PHARMACY W/ HCPCS (ALT 636 FOR OP/ED): Performed by: PHYSICIAN ASSISTANT

## 2025-08-13 PROCEDURE — 76801 OB US < 14 WKS SINGLE FETUS: CPT

## 2025-08-13 PROCEDURE — 96360 HYDRATION IV INFUSION INIT: CPT

## 2025-08-13 PROCEDURE — 86901 BLOOD TYPING SEROLOGIC RH(D): CPT | Performed by: PHYSICIAN ASSISTANT

## 2025-08-13 PROCEDURE — 84702 CHORIONIC GONADOTROPIN TEST: CPT | Performed by: PHYSICIAN ASSISTANT

## 2025-08-13 PROCEDURE — 81001 URINALYSIS AUTO W/SCOPE: CPT | Performed by: PHYSICIAN ASSISTANT

## 2025-08-13 PROCEDURE — 99284 EMERGENCY DEPT VISIT MOD MDM: CPT | Mod: 25

## 2025-08-13 PROCEDURE — 76817 TRANSVAGINAL US OBSTETRIC: CPT | Performed by: RADIOLOGY

## 2025-08-13 PROCEDURE — 80053 COMPREHEN METABOLIC PANEL: CPT | Performed by: PHYSICIAN ASSISTANT

## 2025-08-13 RX ORDER — ACETAMINOPHEN 325 MG/1
975 TABLET ORAL ONCE
Status: DISCONTINUED | OUTPATIENT
Start: 2025-08-13 | End: 2025-08-13 | Stop reason: HOSPADM

## 2025-08-13 RX ADMIN — SODIUM CHLORIDE 1000 ML: 900 INJECTION, SOLUTION INTRAVENOUS at 12:19

## 2025-08-13 ASSESSMENT — PAIN DESCRIPTION - LOCATION: LOCATION: ABDOMEN

## 2025-08-13 ASSESSMENT — PAIN SCALES - GENERAL
PAINLEVEL_OUTOF10: 4
PAINLEVEL_OUTOF10: 0 - NO PAIN

## 2025-08-13 ASSESSMENT — PAIN - FUNCTIONAL ASSESSMENT: PAIN_FUNCTIONAL_ASSESSMENT: 0-10
